# Patient Record
Sex: MALE | Race: WHITE
[De-identification: names, ages, dates, MRNs, and addresses within clinical notes are randomized per-mention and may not be internally consistent; named-entity substitution may affect disease eponyms.]

---

## 2019-09-28 ENCOUNTER — HEALTH MAINTENANCE LETTER (OUTPATIENT)
Age: 69
End: 2019-09-28

## 2020-03-15 ENCOUNTER — HEALTH MAINTENANCE LETTER (OUTPATIENT)
Age: 70
End: 2020-03-15

## 2021-01-10 ENCOUNTER — HEALTH MAINTENANCE LETTER (OUTPATIENT)
Age: 71
End: 2021-01-10

## 2021-05-08 ENCOUNTER — HEALTH MAINTENANCE LETTER (OUTPATIENT)
Age: 71
End: 2021-05-08

## 2021-10-23 ENCOUNTER — HEALTH MAINTENANCE LETTER (OUTPATIENT)
Age: 71
End: 2021-10-23

## 2022-06-04 ENCOUNTER — HEALTH MAINTENANCE LETTER (OUTPATIENT)
Age: 72
End: 2022-06-04

## 2022-10-09 ENCOUNTER — HEALTH MAINTENANCE LETTER (OUTPATIENT)
Age: 72
End: 2022-10-09

## 2023-06-10 ENCOUNTER — HEALTH MAINTENANCE LETTER (OUTPATIENT)
Age: 73
End: 2023-06-10

## 2024-08-14 ENCOUNTER — TRANSCRIBE ORDERS (OUTPATIENT)
Dept: OTHER | Age: 74
End: 2024-08-14

## 2024-08-14 ENCOUNTER — TELEPHONE (OUTPATIENT)
Dept: CARDIOLOGY | Facility: CLINIC | Age: 74
End: 2024-08-14
Payer: MEDICAID

## 2024-08-14 DIAGNOSIS — E85.9 AMYLOIDOSIS (H): Primary | ICD-10-CM

## 2024-08-14 NOTE — TELEPHONE ENCOUNTER
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      Action Taken: Message routed to:  [facility] cardio    Travel Screening: Not Applicable     Date of Service:

## 2024-08-21 NOTE — TELEPHONE ENCOUNTER
Talked with pt to schedule appt for genetic counseling with Vicki Frost. Pt is unable to schedule at the moment and will call back in a couple days. Left pt with call back number.

## 2024-08-22 ENCOUNTER — TRANSCRIBE ORDERS (OUTPATIENT)
Dept: OTHER | Age: 74
End: 2024-08-22

## 2024-08-22 DIAGNOSIS — E85.9 AMYLOIDOSIS, UNSPECIFIED TYPE (H): Primary | ICD-10-CM

## 2024-12-05 NOTE — PROGRESS NOTES
Here is a copy of the progress note from your recent genetic counseling visit to [facility] on Date: 12/6/2024.    PROGRESS NOTE: Michael was referred by Dr. Saucedo for genetic counseling due to his history of left ventricular hypertrophy (LVH) and the possibility of an underlying genetic component.  I had the opportunity to talk with Freddy today to discuss the genetic component of LVH and testing options available to him.     MEDICAL HISTORY: Freddy initially met with Dr. Saucedo due to chest pain with exertion, shortness of breath, and some fainting. Cardiac MRI on 7/8/2020 showed severe global left ventricular hypertrophy with additional evidence of asymmetric thickening of the left ventricular septum (Max LV thickness 2.7 cm), no evidence of fibrosis/necrosis on gadolinium-based sequences, hyperdynamic LV systolic function (LVEF 70%), and small inferior pericardial effusion.     Echocardiogram on 4/11/2024 showed normal left ventricular systolic function (LVEF 65-70%), severe global left ventricular hypertrophy in addition to asymmetric septal thickening, basal septum appears 2.7 cm in some views, systolic anterior motion of the mitral is present, and normal right ventricle size and systolic function.    He had non-obstructive CAD from March 2020 angiogram and normal LVEF from July 2020 cardiac MRI. Cardiac MRI also revealed thickened myocardial tissue with hypertrophy due to hypertension. He had right heart catheterization in Oct 2020 which showed elevated filling pressures. Diuretic dosing has been stable. Had echocardiogram in April 2024 which showed normal EF. There was report of systolic anterior motion of the mitral valve.     History is also remarkable for hypertension, non-obstructive CAD diagnosed by angiogram in 2020, high cholesterol, stage III kidney disease, arthritis, and ADHD/OCD.     FAMILY HISTORY: A detailed family history was obtained today and was significant  for the following cardiac history:    Mother  at 48 years.  She developed clots after hip replacement and had a stroke.  Limited information about 6 maternal aunts/uncles.  Maternal grandmother  30-40's, cause unknown.  Grandfather  around 90.  Father  at 92 years with spinal stenosis.  Paternal aunt  of old age.  Paternal uncle  80s with liver cancer.  Limited information on 3 other paternal aunts/uncle.  Paternal grandmother  around 40 years, no details known.  Grandfather  60-70 from lung disease thought to be related to factory work.  Brother  at 36 years from colorectal cancer.  Son (42) in good health. No children.  Daughter (40) in good health. No children.  Son (35) with mild mental retardation and ADHD.  There is no additional history of cardiomyopathy, arrhythmias, heart attacks, fainting, sudden cardiac death, genetic conditions, or birth defects. (Scanned pedigree may be under media tab)    DISCUSSION:  Reviewed definition of left ventricular hypertrophy and possible causes including transthyretin (TTR) amyloidosis and hypertrophic cardiomyopathy (HCM). Explained that a good portion of HCM cases have a genetic component.  TTR amyloidosis can be hereditary and sporadic (known as wild type).     Reviewed autosomal dominant (AD) inheritance pattern most commonly associated with HCM and hereditary TTR amyloidosis, including the 50% risk for recurrence. Briefly reviewed autosomal recessive and X-linked inheritance. Explained that HCM gene mutations are associated with reduced penetrance and variable expressivity, meaning that individuals who carry a gene mutation may or may not get the disease and onset and severity can vary from one family member to the next. This explains why you may not see cardiomyopathy in each generation of a family.     Genetic testing is indicated for individuals with cardiomyopathy to better understand the cause of their heart disease, progression,  the likelihood of noncardiac health risks, availability of gene therapy or enzyme replacement therapies, and risk to relatives. Genetic testing that includes a panel of genes is the most cost effective and timely approach. Reviewed capabilities, limitations, and logistics of testing. Currently over 30 genes have been found to be related to HCM. Genetic testing currently identifies mutations in about 50-60% of idiopathic HCM cases and >99% of hereditary TTR amyloidosis cases.     DNA sample via saliva or blood is collected and sent to testing lab for evaluation of selected genes. The results could directly impact care and treatment. This testing is medically necessary.     Explained three possible outcomes of genetic testing including: positive identification of a mutation, no mutation identified, and identification of a variant of unknown significance (VUS). If a mutation is identified, presymptomatic testing would be available to at risk family members. If no mutation is identified, it does not rule out the possibility of a genetic component to this disease. Family members could still be at risk for developing the same condition. If a VUS is identified, it is unclear if the mutation is disease causing or just a normal variation. It may take time and possibly additional testing to determine the meaning of a VUS result.     Test results take approximately 2-4 weeks on average. Discussed cost of testing through commercial labs. Explained that the lab will work with insurance to determine coverage and contact patient if out of pocket costs are expected to exceed $100. If so, patient has the option to pay reported price, cancel testing or elect self pay pricing (typcially around $250).     Discussed pros and cons of genetic testing. Explained that results could determine the cause for HCM. If a mutation is identified, presymptomatic testing is available to all at risk relatives. Reviewed possible issues associated with  presymptomatic testing including genetic discrimination, current laws to prevent discrimination (ie. JOSE A), insurance issues, and emotional and psychosocial outcomes of testing.     Recommend clinical evaluation for all first degree relatives (parents, siblings, and children) of an affected individual regardless of decision to pursue genetic testing. Based on the Heart Failure Society of [place] Practice Guidelines (Lyssa et al, 2018), clinical evaluation should be performed at least every 3 years, except yearly during puberty, and should include history, cardiac exam, ECHO, EKG, Holter monitoring, and exercise treadmill.    All questions answered at this time.     PLAN: Michael elected to proceed with genetic testing for HCM panel, which also includes TTR gene.  Requisition and consent forms were completed and signed.  DNA will be collected via blood sample and sent to [facility] laboratory. I will contact patient when results are available.    TOTAL TIME SPENT IN COUNSELIN minutes    Vicki Frost MS, [place]  Licensed, Certified Genetic Counselor  [facility]  [facility]        needs fu in 1 wk no

## 2024-12-06 ENCOUNTER — LAB (OUTPATIENT)
Dept: LAB | Facility: CLINIC | Age: 74
End: 2024-12-06
Payer: COMMERCIAL

## 2024-12-06 ENCOUNTER — OFFICE VISIT (OUTPATIENT)
Dept: CARDIOLOGY | Facility: CLINIC | Age: 74
End: 2024-12-06
Attending: GENETIC COUNSELOR, MS
Payer: COMMERCIAL

## 2024-12-06 DIAGNOSIS — E85.9 AMYLOIDOSIS (H): ICD-10-CM

## 2024-12-06 DIAGNOSIS — I51.7 LEFT VENTRICULAR HYPERTROPHY: ICD-10-CM

## 2024-12-06 DIAGNOSIS — E85.9 AMYLOIDOSIS, UNSPECIFIED TYPE (H): ICD-10-CM

## 2024-12-06 LAB
PERFORMING LABORATORY: NORMAL
SPECIMEN STATUS: NORMAL
TEST NAME: NORMAL

## 2024-12-06 PROCEDURE — 96040 HC GENETIC COUNSELING, EACH 30 MINUTES: CPT | Performed by: GENETIC COUNSELOR, MS

## 2024-12-06 PROCEDURE — 99000 SPECIMEN HANDLING OFFICE-LAB: CPT | Performed by: PATHOLOGY

## 2024-12-06 PROCEDURE — 36415 COLL VENOUS BLD VENIPUNCTURE: CPT | Performed by: PATHOLOGY

## 2024-12-06 NOTE — PATIENT INSTRUCTIONS
"Indication for Genetic Counseling:     Left ventricular hypertrophy (LVH) occurs when the heart muscle that makes up the left ventricle becomes thick and enlarged.  LVH can occur for many reasons but two common genetic causes are hypertrophic cardiomyopathy (HCM) and transthyretin (TTR) amyloidosis. It is usually diagnosed by echocardiogram (ECHO) or cardiac magnetic resonance imaging (MRI).  When the heart becomes enlarged, it cannot pump blood as effectively, which can lead to symptoms such as shortness of breath, fatigue, chest pains, irregular heartbeat, fainting, stroke, cardiac arrest, and sudden cardiac death (SCD).  HCM often occurs as an isolated condition but TTR amyloidosis can affect several organs resulting in other symptoms including neuropathy, spinal stenosis, carpal tunnel, and GI issues. Genetic testing is available to look at potential causes.     Inheritance:   Humans have over 20,000 genes that instruct our bodies how to function.  We have two copies of each gene because we inherit one from our mother and one from our father.  In most cardiac cases with a genetic component, the condition is inherited in an autosomal dominant (AD) pattern.  This means that in order to have the condition, a person needs to inherit a mutation on one copy of a particular gene.  This mutation or pathogenic variant dominates the \"normal\" working copy of the gene.  When an affected individual has children, they can either pass on the \"normal\" copy of the gene or the mutation.  Therefore, children have a 50% chance of inheriting the mutation.  Other family members also have an increased risk but the specific risk depends on the degree of relationship.  Additional inheritance patterns can occur within families and may alter the risk of recurrence.     Testing Options:   Genetic testing is available to assess a panel of genes known to cause this condition.  This test reads through the DNA (sequencing) of these genes to " look for spelling mistakes or mutations that could cause the condition.      There are three types of results you could receive from this test.     -Positive result (mutation identified) - confirms diagnosis and provides an answer to why this happened.  In addition, identifying a mutation allows family members to have testing to determine their risk.     -Negative result (mutation not identified) - no genetic changes were identified.  This does not rule out a genetic cause for the condition as the genetic testing only identifies 50-60% of genetic causes for HCM and >99% of hereditary TTR amyloidosis.    -Variant of uncertain significance (VUS) - a genetic change was identified, but there is not enough information to determine whether it is disease-causing or normal human genetic variation.     Although genetic testing may identify a mutation, it cannot provide information about the severity of symptoms or the progression of disease.  We cannot predict age of onset or severity of symptoms due to reduced penetrance and variable expressivity.    Logistics:   Genetic testing involves collecting a sample of DNA, thru blood, saliva, or cheek cells.  The sample will be sent to a laboratory to extract the DNA and sequence the genes for mutations.  The laboratory will work with your insurance company to determine the out of pocket (OOP) cost and will notify you if the OOP cost is greater than $100.  Remember to ask the lab about financial assistance pricing and self pay options as well.  Sometimes those are much lower than insurance pricing.  When testing is initiated, results take about 2-4 weeks to return. I will contact you over the phone when results are available.     Genetic Information and Nondiscrimination Act:  The Genetic Information and Nondiscrimination Act of 2008 (JOSE A) is a federal law that protects individuals from genetic discrimination in health insurance and employment. Genetic discrimination is defined as  the misuse of genetic information. This law does not address potential discrimination regarding life insurance or disability insurance.      This is especially relevant for at risk individuals who are considering presymptomatic testing.    Screening Recommendations:  Recommend clinical evaluation for all first degree relatives (parents, siblings, and children) of an affected individual regardless of decision to pursue genetic testing.  Clinical evaluation should be performed at least every 3 years, except yearly during puberty, and should include history, cardiac exam, ECHO, EKG, Holter monitoring, and exercise treadmill.    Resources:  Hypertrophic Cardiomyopathy Association - 4hcm.org  Children's Cardiomyopathy Foundation - childrenscardiomyopathy.org  [place] Cardiomyopathy Association - cardiomyopathy.org  HCM Care phone landen    General   American Heart Association - americanheart.org  Genetics Home Reference - ghr.nlm.nih.gov  Genetic Information and Nondiscrimination Act - Capicalnahelp.org    Contact Information:  Vicki Frost MS, [facility] [place]  Licensed, Certified Genetic Counselor  [facility]  [facility]

## 2024-12-06 NOTE — LETTER
12/6/2024      RE: Michael Aden  [address] [place] [facility] [id number]       Dear Colleague,    Thank you for the opportunity to participate in the care of your patient, Michael Aden, at the [facility] [place] [facility]. Please see a copy of my visit note below.    Here is a copy of the progress note from your recent genetic counseling visit to the [facility] on Date: 12/6/2024.    PROGRESS NOTE: Michael was referred by Dr. Saucedo for genetic counseling due to his history of left ventricular hypertrophy (LVH) and the possibility of an underlying genetic component.  I had the opportunity to talk with Freddy today to discuss the genetic component of LVH and testing options available to him.     MEDICAL HISTORY: Freddy initially met with Dr. Saucedo due to chest pain with exertion, shortness of breath, and some fainting. Cardiac MRI on 7/8/2020 showed severe global left ventricular hypertrophy with additional evidence of asymmetric thickening of the left ventricular septum (Max LV thickness 2.7 cm), no evidence of fibrosis/necrosis on gadolinium-based sequences, hyperdynamic LV systolic function (LVEF 70%), and small inferior pericardial effusion.     Echocardiogram on 4/11/2024 showed normal left ventricular systolic function (LVEF 65-70%), severe global left ventricular hypertrophy in addition to asymmetric septal thickening, basal septum appears 2.7 cm in some views, systolic anterior motion of the mitral is present, and normal right ventricle size and systolic function.    He had non-obstructive CAD from March 2020 angiogram and normal LVEF from July 2020 cardiac MRI. Cardiac MRI also revealed thickened myocardial tissue with hypertrophy due to hypertension. He had right heart catheterization in Oct 2020 which showed elevated filling pressures. Diuretic dosing has been stable. Had echocardiogram in April 2024 which  showed normal EF. There was report of systolic anterior motion of the mitral valve.     History is also remarkable for hypertension, non-obstructive CAD diagnosed by angiogram in , high cholesterol, stage III kidney disease, arthritis, and ADHD/OCD.     FAMILY HISTORY: A detailed family history was obtained today and was significant for the following cardiac history:    Mother  at 48 years.  She developed clots after hip replacement and had a stroke.  Limited information about 6 maternal aunts/uncles.  Maternal grandmother  30-40's, cause unknown.  Grandfather  around 90.  Father  at 92 years with spinal stenosis.  Paternal aunt  of old age.  Paternal uncle  80s with liver cancer.  Limited information on 3 other paternal aunts/uncle.  Paternal grandmother  around 40 years, no details known.  Grandfather  60-70 from lung disease thought to be related to factory work.  Brother  at 36 years from colorectal cancer.  Son (42) in good health. No children.  Daughter (40) in good health. No children.  Son (35) with mild mental retardation and ADHD.  There is no additional history of cardiomyopathy, arrhythmias, heart attacks, fainting, sudden cardiac death, genetic conditions, or birth defects. (Scanned pedigree may be under media tab)    DISCUSSION:  Reviewed definition of left ventricular hypertrophy and possible causes including transthyretin (TTR) amyloidosis and hypertrophic cardiomyopathy (HCM). Explained that a good portion of HCM cases have a genetic component.  TTR amyloidosis can be hereditary and sporadic (known as wild type).     Reviewed autosomal dominant (AD) inheritance pattern most commonly associated with HCM and hereditary TTR amyloidosis, including the 50% risk for recurrence. Briefly reviewed autosomal recessive and X-linked inheritance. Explained that HCM gene mutations are associated with reduced penetrance and variable expressivity, meaning that individuals who  carry a gene mutation may or may not get the disease and onset and severity can vary from one family member to the next. This explains why you may not see cardiomyopathy in each generation of a family.     Genetic testing is indicated for individuals with cardiomyopathy to better understand the cause of their heart disease, progression, the likelihood of noncardiac health risks, availability of gene therapy or enzyme replacement therapies, and risk to relatives. Genetic testing that includes a panel of genes is the most cost effective and timely approach. Reviewed capabilities, limitations, and logistics of testing. Currently over 30 genes have been found to be related to HCM. Genetic testing currently identifies mutations in about 50-60% of idiopathic HCM cases and >99% of hereditary TTR amyloidosis cases.     DNA sample via saliva or blood is collected and sent to testing lab for evaluation of selected genes. The results could directly impact care and treatment. This testing is medically necessary.     Explained three possible outcomes of genetic testing including: positive identification of a mutation, no mutation identified, and identification of a variant of unknown significance (VUS). If a mutation is identified, presymptomatic testing would be available to at risk family members. If no mutation is identified, it does not rule out the possibility of a genetic component to this disease. Family members could still be at risk for developing the same condition. If a VUS is identified, it is unclear if the mutation is disease causing or just a normal variation. It may take time and possibly additional testing to determine the meaning of a VUS result.     Test results take approximately 2-4 weeks on average. Discussed cost of testing through commercial labs. Explained that the lab will work with insurance to determine coverage and contact patient if out of pocket costs are expected to exceed $100. If so, patient has  the option to pay reported price, cancel testing or elect self pay pricing (typcially around $250).     Discussed pros and cons of genetic testing. Explained that results could determine the cause for HCM. If a mutation is identified, presymptomatic testing is available to all at risk relatives. Reviewed possible issues associated with presymptomatic testing including genetic discrimination, current laws to prevent discrimination (ie. JOSE A), insurance issues, and emotional and psychosocial outcomes of testing.     Recommend clinical evaluation for all first degree relatives (parents, siblings, and children) of an affected individual regardless of decision to pursue genetic testing. Based on the Heart Failure Society of [place] Practice Guidelines (Lyssa et al, 2018), clinical evaluation should be performed at least every 3 years, except yearly during puberty, and should include history, cardiac exam, ECHO, EKG, Holter monitoring, and exercise treadmill.    All questions answered at this time.     PLAN: Michael elected to proceed with genetic testing for HCM panel, which also includes TTR gene.  Requisition and consent forms were completed and signed.  DNA will be collected via blood sample and sent to [facility] laboratory. I will contact patient when results are available.    TOTAL TIME SPENT IN COUNSELIN minutes    Vikci Frost MS, [facility] [place]  Licensed, Certified Genetic Counselor  [facility]  [facility]         Please do not hesitate to contact me if you have any questions/concerns.     Sincerely,     Vicki Frost GC

## 2024-12-18 LAB
SCANNED LAB RESULT: NORMAL
TEST NAME: NORMAL

## 2024-12-23 ENCOUNTER — TELEPHONE (OUTPATIENT)
Dept: CARDIOLOGY | Facility: CLINIC | Age: 74
End: 2024-12-23
Payer: COMMERCIAL

## 2024-12-31 NOTE — TELEPHONE ENCOUNTER
Spoke with Michael today to review results of genetic testing. He underwent genetic testing on December 6, 2024 due to his diagnosis of hypertrophic cardiomyopathy (HCM).   Genetic testing for the HCM panel thru [employer] revealed that Michael does NOT carry a mutation in the 30 genes analyzed. It is predicted that this panel will identify mutations in 50-60% of HCM cases.   Therefore, this negative result does not rule out a genetic basis for the HCM in Michael but eliminates the option of presymptomatic genetic testing in at risk family members, at this time. However, since this condition could still be genetic, clinical screening is recommended in all first degree relatives (children, siblings, parents).  Clinical screening should include cardiac exam, ECHO, and EKG to assess their current status. Testing may also include heart monitor, stress testing, and MRI.   Explained that with continued research and genetic knowledge the detection rate for identifying mutations may increase over time. Therefore, it is worth touching base in the years to come to learn about new testing options.   A summary letter and copy of the results will be sent to patient. All questions answered at this time.   Vicki Frost MS, [facility] [place]  Licensed, Certified Genetic Counselor  [facility]  [facility]